# Patient Record
Sex: MALE | Race: WHITE | NOT HISPANIC OR LATINO | URBAN - METROPOLITAN AREA
[De-identification: names, ages, dates, MRNs, and addresses within clinical notes are randomized per-mention and may not be internally consistent; named-entity substitution may affect disease eponyms.]

---

## 2020-08-25 PROBLEM — Z00.00 ENCOUNTER FOR PREVENTIVE HEALTH EXAMINATION: Status: ACTIVE | Noted: 2020-08-25

## 2020-09-01 ENCOUNTER — OUTPATIENT (OUTPATIENT)
Dept: OUTPATIENT SERVICES | Facility: HOSPITAL | Age: 30
LOS: 1 days | Discharge: HOME | End: 2020-09-01

## 2020-09-01 ENCOUNTER — APPOINTMENT (OUTPATIENT)
Dept: DERMATOLOGY | Facility: CLINIC | Age: 30
End: 2020-09-01
Payer: SELF-PAY

## 2020-09-01 PROCEDURE — 99202 OFFICE O/P NEW SF 15 MIN: CPT | Mod: GC

## 2020-09-01 NOTE — ASSESSMENT
[FreeTextEntry1] : 29 yo with hx of psoriasis complaining of psoriasis w/psoriatic skin lesions of the eyebrows and posterior neck, seborrheic dandruff in scalp. \par \par #Psoriasis\par - Betamethasone Dipropionate 0.05% external ointment: apply sparingly to the posterior and lateral neck twice daily. \par ***Do not apply Betamethasone to eyebrows, face, genitalia, or breasts*** \par - Hydrocortisone 2.5% external cream; apply sparingly to the eyebrows twice daily \par \par #Seborrheic dandruff\par Ketoconazole 2% external shampoo; apply twice weekly to scalp for 5 minutes and rinse\par \par follow up in January

## 2020-09-01 NOTE — PHYSICAL EXAM
[FreeTextEntry3] : There is abundant dandruff in the scalp and eyebrows. No skin legions visible in the scalp. There is mild scaling and redness in the b/l eyebrows. Moderate flaking, scales, and raised erythema in the posterior neck region extended bilaterally to the posterior auricle. No skin legions in the arms, legs, or back.

## 2021-05-04 ENCOUNTER — APPOINTMENT (OUTPATIENT)
Dept: DERMATOLOGY | Facility: CLINIC | Age: 31
End: 2021-05-04
Payer: SELF-PAY

## 2021-05-04 ENCOUNTER — OUTPATIENT (OUTPATIENT)
Dept: OUTPATIENT SERVICES | Facility: HOSPITAL | Age: 31
LOS: 1 days | Discharge: HOME | End: 2021-05-04

## 2021-05-04 VITALS
DIASTOLIC BLOOD PRESSURE: 67 MMHG | HEART RATE: 62 BPM | SYSTOLIC BLOOD PRESSURE: 106 MMHG | TEMPERATURE: 97.7 F | OXYGEN SATURATION: 97 % | WEIGHT: 177 LBS

## 2021-05-04 VITALS — HEIGHT: 70 IN | BODY MASS INDEX: 25.4 KG/M2

## 2021-05-04 PROCEDURE — 99213 OFFICE O/P EST LOW 20 MIN: CPT | Mod: GC

## 2021-05-04 RX ORDER — TRIAMCINOLONE ACETONIDE 1 MG/G
0.1 CREAM TOPICAL TWICE DAILY
Qty: 1 | Refills: 5 | Status: ACTIVE | COMMUNITY
Start: 2021-05-04 | End: 1900-01-01

## 2021-05-04 NOTE — END OF VISIT
[] : A student assisted with documenting this visit. I have reviewed and verified all information documented by the student, and made modifications to such information, when appropriate. [FreeTextEntry3] : Resident at [facility] with psoriasis, may be non compliant.\par Plat to give topical steroid with larger quantity provided.\par Screening labs for possible systemic treatment.

## 2021-05-04 NOTE — HISTORY OF PRESENT ILLNESS
[de-identified] : 30 y/o M from [facility] facility w/ PMHx of psoriasis since age 13 and seborrheic dermatitis who presents with whole body rashes. Patient reports that betamethasone and hydrocortisone is ineffective at this time. He states he wants injection for psoriasis. Patient states that he has new rashes appearing on both his arms and legs. He states the Ketoconazole shampoo is helping with his scalp. Patient states he has never had symptoms like this before and his grandfather had psoriasis. Patient states he is on Haldol and risperidone. Patient endorses occasional itchiness. He denies LAD, bleeding, discharge or new moles.

## 2021-05-04 NOTE — ASSESSMENT
[FreeTextEntry1] : 32 y/o M from \[facility]\"" facility w/ PMHx of psoriasis since age 13 and seborrheic dermatitis who presents with whole body rashes.\par \par #psoriasis\par - patient has new, all-over body, rash formation and current creams are ineffective.\par - Prescribed triamcinolone cream in jar \par - Order blood work for possible systemic treatment with MTX, if patient does not respond to cream. \par \par #seborrheic dermatitis, scalp\par - c/w ketoconazole shampoo as needed\par \par Follow up in 1 month. \par \par

## 2021-05-04 NOTE — PHYSICAL EXAM
[Alert] : alert [Oriented x 3] : ~L oriented x 3 [Well Nourished] : well nourished [R Arm] : R Arm [L Arm] : L Arm [R Leg] : R Leg [L Leg] : L Leg [FreeTextEntry3] : New rash formation noted b/l on arms and legs (red, irregular distributed patches).

## 2021-05-10 DIAGNOSIS — L40.9 PSORIASIS, UNSPECIFIED: ICD-10-CM

## 2021-05-11 LAB
ALBUMIN SERPL ELPH-MCNC: 4.6 G/DL
ALP BLD-CCNC: 101 U/L
ALT SERPL-CCNC: 50 U/L
ANION GAP SERPL CALC-SCNC: 14 MMOL/L
AST SERPL-CCNC: 31 U/L
BASOPHILS # BLD AUTO: 0.05 K/UL
BASOPHILS NFR BLD AUTO: 1 %
BILIRUB SERPL-MCNC: 0.2 MG/DL
BUN SERPL-MCNC: 9 MG/DL
CALCIUM SERPL-MCNC: 9.9 MG/DL
CHLORIDE SERPL-SCNC: 103 MMOL/L
CO2 SERPL-SCNC: 24 MMOL/L
CREAT SERPL-MCNC: 0.9 MG/DL
EOSINOPHIL # BLD AUTO: 0.24 K/UL
EOSINOPHIL NFR BLD AUTO: 4.7 %
GLUCOSE SERPL-MCNC: 96 MG/DL
HBV SURFACE AB SER QL: REACTIVE
HBV SURFACE AG SER QL: NONREACTIVE
HCT VFR BLD CALC: 43.2 %
HCV AB SER QL: NONREACTIVE
HCV S/CO RATIO: 0.14 S/CO
HGB BLD-MCNC: 14.5 G/DL
IMM GRANULOCYTES NFR BLD AUTO: 0.4 %
LYMPHOCYTES # BLD AUTO: 1.31 K/UL
LYMPHOCYTES NFR BLD AUTO: 25.8 %
MAN DIFF?: NORMAL
MCHC RBC-ENTMCNC: 30.9 PG
MCHC RBC-ENTMCNC: 33.6 G/DL
MCV RBC AUTO: 91.9 FL
MONOCYTES # BLD AUTO: 0.54 K/UL
MONOCYTES NFR BLD AUTO: 10.6 %
NEUTROPHILS # BLD AUTO: 2.92 K/UL
NEUTROPHILS NFR BLD AUTO: 57.5 %
PLATELET # BLD AUTO: 295 K/UL
POTASSIUM SERPL-SCNC: 4.5 MMOL/L
PROT SERPL-MCNC: 7.7 G/DL
RBC # BLD: 4.7 M/UL
RBC # FLD: 12 %
SODIUM SERPL-SCNC: 141 MMOL/L
WBC # FLD AUTO: 5.08 K/UL

## 2021-06-02 ENCOUNTER — EMERGENCY (EMERGENCY)
Facility: HOSPITAL | Age: 31
LOS: 0 days | Discharge: HOME | End: 2021-06-02
Attending: EMERGENCY MEDICINE | Admitting: EMERGENCY MEDICINE
Payer: SUBSIDIZED

## 2021-06-02 VITALS
HEIGHT: 70 IN | SYSTOLIC BLOOD PRESSURE: 110 MMHG | DIASTOLIC BLOOD PRESSURE: 72 MMHG | WEIGHT: 175.05 LBS | RESPIRATION RATE: 18 BRPM | OXYGEN SATURATION: 96 % | TEMPERATURE: 98 F | HEART RATE: 89 BPM

## 2021-06-02 DIAGNOSIS — R25.1 TREMOR, UNSPECIFIED: ICD-10-CM

## 2021-06-02 DIAGNOSIS — F20.9 SCHIZOPHRENIA, UNSPECIFIED: ICD-10-CM

## 2021-06-02 DIAGNOSIS — Z87.2 PERSONAL HISTORY OF DISEASES OF THE SKIN AND SUBCUTANEOUS TISSUE: ICD-10-CM

## 2021-06-02 DIAGNOSIS — R11.2 NAUSEA WITH VOMITING, UNSPECIFIED: ICD-10-CM

## 2021-06-02 DIAGNOSIS — Z87.898 PERSONAL HISTORY OF OTHER SPECIFIED CONDITIONS: ICD-10-CM

## 2021-06-02 LAB
ALBUMIN SERPL ELPH-MCNC: 4.7 G/DL — SIGNIFICANT CHANGE UP (ref 3.5–5.2)
ALP SERPL-CCNC: 105 U/L — SIGNIFICANT CHANGE UP (ref 30–115)
ALT FLD-CCNC: 83 U/L — HIGH (ref 0–41)
ANION GAP SERPL CALC-SCNC: 10 MMOL/L — SIGNIFICANT CHANGE UP (ref 7–14)
AST SERPL-CCNC: 69 U/L — HIGH (ref 0–41)
BASOPHILS # BLD AUTO: 0.03 K/UL — SIGNIFICANT CHANGE UP (ref 0–0.2)
BASOPHILS NFR BLD AUTO: 0.5 % — SIGNIFICANT CHANGE UP (ref 0–1)
BILIRUB SERPL-MCNC: 0.5 MG/DL — SIGNIFICANT CHANGE UP (ref 0.2–1.2)
BUN SERPL-MCNC: 16 MG/DL — SIGNIFICANT CHANGE UP (ref 10–20)
CALCIUM SERPL-MCNC: 9.6 MG/DL — SIGNIFICANT CHANGE UP (ref 8.5–10.1)
CHLORIDE SERPL-SCNC: 99 MMOL/L — SIGNIFICANT CHANGE UP (ref 98–110)
CO2 SERPL-SCNC: 29 MMOL/L — SIGNIFICANT CHANGE UP (ref 17–32)
CREAT SERPL-MCNC: 0.9 MG/DL — SIGNIFICANT CHANGE UP (ref 0.7–1.5)
EOSINOPHIL # BLD AUTO: 0.02 K/UL — SIGNIFICANT CHANGE UP (ref 0–0.7)
EOSINOPHIL NFR BLD AUTO: 0.3 % — SIGNIFICANT CHANGE UP (ref 0–8)
GLUCOSE SERPL-MCNC: 110 MG/DL — HIGH (ref 70–99)
HCT VFR BLD CALC: 42.8 % — SIGNIFICANT CHANGE UP (ref 42–52)
HGB BLD-MCNC: 14.6 G/DL — SIGNIFICANT CHANGE UP (ref 14–18)
IMM GRANULOCYTES NFR BLD AUTO: 0.3 % — SIGNIFICANT CHANGE UP (ref 0.1–0.3)
LIDOCAIN IGE QN: 39 U/L — SIGNIFICANT CHANGE UP (ref 7–60)
LYMPHOCYTES # BLD AUTO: 1.2 K/UL — SIGNIFICANT CHANGE UP (ref 1.2–3.4)
LYMPHOCYTES # BLD AUTO: 18.1 % — LOW (ref 20.5–51.1)
MAGNESIUM SERPL-MCNC: 2 MG/DL — SIGNIFICANT CHANGE UP (ref 1.8–2.4)
MCHC RBC-ENTMCNC: 30.2 PG — SIGNIFICANT CHANGE UP (ref 27–31)
MCHC RBC-ENTMCNC: 34.1 G/DL — SIGNIFICANT CHANGE UP (ref 32–37)
MCV RBC AUTO: 88.4 FL — SIGNIFICANT CHANGE UP (ref 80–94)
MONOCYTES # BLD AUTO: 0.4 K/UL — SIGNIFICANT CHANGE UP (ref 0.1–0.6)
MONOCYTES NFR BLD AUTO: 6 % — SIGNIFICANT CHANGE UP (ref 1.7–9.3)
NEUTROPHILS # BLD AUTO: 4.96 K/UL — SIGNIFICANT CHANGE UP (ref 1.4–6.5)
NEUTROPHILS NFR BLD AUTO: 74.8 % — SIGNIFICANT CHANGE UP (ref 42.2–75.2)
NRBC # BLD: 0 /100 WBCS — SIGNIFICANT CHANGE UP (ref 0–0)
PLATELET # BLD AUTO: 281 K/UL — SIGNIFICANT CHANGE UP (ref 130–400)
POTASSIUM SERPL-MCNC: 6.7 MMOL/L — CRITICAL HIGH (ref 3.5–5)
POTASSIUM SERPL-SCNC: 6.7 MMOL/L — CRITICAL HIGH (ref 3.5–5)
PROT SERPL-MCNC: 8 G/DL — SIGNIFICANT CHANGE UP (ref 6–8)
RBC # BLD: 4.84 M/UL — SIGNIFICANT CHANGE UP (ref 4.7–6.1)
RBC # FLD: 11.9 % — SIGNIFICANT CHANGE UP (ref 11.5–14.5)
SODIUM SERPL-SCNC: 138 MMOL/L — SIGNIFICANT CHANGE UP (ref 135–146)
WBC # BLD: 6.63 K/UL — SIGNIFICANT CHANGE UP (ref 4.8–10.8)
WBC # FLD AUTO: 6.63 K/UL — SIGNIFICANT CHANGE UP (ref 4.8–10.8)

## 2021-06-02 PROCEDURE — 93010 ELECTROCARDIOGRAM REPORT: CPT

## 2021-06-02 PROCEDURE — 99285 EMERGENCY DEPT VISIT HI MDM: CPT

## 2021-06-02 PROCEDURE — 99053 MED SERV 10PM-8AM 24 HR FAC: CPT

## 2021-06-02 RX ORDER — ONDANSETRON 8 MG/1
4 TABLET, FILM COATED ORAL ONCE
Refills: 0 | Status: COMPLETED | OUTPATIENT
Start: 2021-06-02 | End: 2021-06-02

## 2021-06-02 RX ORDER — SODIUM CHLORIDE 9 MG/ML
1000 INJECTION INTRAMUSCULAR; INTRAVENOUS; SUBCUTANEOUS ONCE
Refills: 0 | Status: COMPLETED | OUTPATIENT
Start: 2021-06-02 | End: 2021-06-02

## 2021-06-02 RX ORDER — FAMOTIDINE 10 MG/ML
20 INJECTION INTRAVENOUS ONCE
Refills: 0 | Status: COMPLETED | OUTPATIENT
Start: 2021-06-02 | End: 2021-06-02

## 2021-06-02 RX ADMIN — FAMOTIDINE 104 MILLIGRAM(S): 10 INJECTION INTRAVENOUS at 06:42

## 2021-06-02 RX ADMIN — ONDANSETRON 4 MILLIGRAM(S): 8 TABLET, FILM COATED ORAL at 06:42

## 2021-06-02 RX ADMIN — SODIUM CHLORIDE 1000 MILLILITER(S): 9 INJECTION INTRAMUSCULAR; INTRAVENOUS; SUBCUTANEOUS at 03:11

## 2021-06-02 NOTE — ED PROVIDER NOTE - PHYSICAL EXAMINATION
VITAL SIGNS: I have reviewed nursing notes and confirm.  CONSTITUTIONAL: Well-developed; well-nourished; in no acute distress.  SKIN: Skin exam is warm and dry, no acute rash.  HEAD: Normocephalic; atraumatic.  EYES: PERRL, EOM intact; conjunctiva and sclera clear, no nystagmus  NECK: Supple; non tender.  CARD: RRR, no murmur  RESP: No wheezes, rales or rhonchi.  ABD: Normal bowel sounds; soft; non-distended; non-tender; no rebound or guarding, no CVA ttp  EXT: Normal ROM. No clubbing, cyanosis or edema.  NEURO: Alert, oriented. Grossly unremarkable. No focal deficits, no tremor at rest or with intention  PSYCH: Cooperative, appropriate, flat affect

## 2021-06-02 NOTE — ED PROVIDER NOTE - OBJECTIVE STATEMENT
32 yo M, hx of 30 yo M, hx of schizophrenia, alcohol and cannabis abuse, psoriasis, here for assessment of nausea, vomiting -- sx began after dinner, associated with feeling shaky. No blood or bile in vomitus, no abdominal pain, though does report feeling burning in his esophagus and back of throat. No diarrhea, constipation. Denies recent drinking, drug use.     No sick contacts, recent travel.

## 2021-06-02 NOTE — ED ADULT TRIAGE NOTE - CHIEF COMPLAINT QUOTE
He's from [facility], they called because he's been vomiting since dinner - EMS  I have a headache, I feel shaky and I keep vomiting - patient

## 2021-06-02 NOTE — ED PROVIDER NOTE - CLINICAL SUMMARY MEDICAL DECISION MAKING FREE TEXT BOX
Labs unremarkable, EKG with normal intervals, patient received zofran, tolerated crackers and juice without further nausea, vomiting, is resting comfortably.    Will dc back to facility. Has no signs of withdrawal, no ttp in abdomen to suggest acute intra-abdominal pathology.

## 2021-06-02 NOTE — ED ADULT NURSE REASSESSMENT NOTE - NS ED NURSE REASSESS COMMENT FT1
Pt d/c & awaiting transport back to [address] [place] [address]; pt still c/o nausea, but was just given nausea medication. Will give it some more time to kick in and reassess.

## 2021-06-02 NOTE — ED PROVIDER NOTE - NSFOLLOWUPINSTRUCTIONS_ED_ALL_ED_FT

## 2021-06-02 NOTE — ED PROVIDER NOTE - NS ED ROS FT
Constitutional: no fever, chills, no recent weight loss, change in appetite or malaise  Cardiac: No chest pain, SOB or edema.  Respiratory: No cough or respiratory distress  GI: see HPI  : No dysuria, frequency, urgency or hematuria  MS: no pain to back or extremities, no loss of ROM, no weakness  Neuro: see HPI  Skin: No skin rash.  Except as documented in the HPI, all other systems are negative.

## 2021-06-02 NOTE — ED ADULT NURSE NOTE - CHIEF COMPLAINT QUOTE
He's from [facility], they called because he's been vomiting since dinner - EMS  I have a headache, I feel shaky and I keep vomiting - patient

## 2021-06-02 NOTE — ED PROVIDER NOTE - PATIENT PORTAL LINK FT
You can access the FollowMyHealth Patient Portal offered by [facility] by registering at the following website: [email or web address]. By joining [facility]’s FollowMyHealth portal, you will also be able to view your health information using other applications (apps) compatible with our system.

## 2021-08-11 NOTE — ED ADULT TRIAGE NOTE - BP NONINVASIVE DIASTOLIC (MM HG)
Pt needs to know if need to renew 2 RX for lasix and furosemide.  Pt is out of both and call pt [phone] if need refills   72

## 2021-10-05 ENCOUNTER — APPOINTMENT (OUTPATIENT)
Dept: DERMATOLOGY | Facility: CLINIC | Age: 31
End: 2021-10-05
Payer: COMMERCIAL

## 2021-10-05 ENCOUNTER — OUTPATIENT (OUTPATIENT)
Dept: OUTPATIENT SERVICES | Facility: HOSPITAL | Age: 31
LOS: 1 days | Discharge: HOME | End: 2021-10-05

## 2021-10-05 DIAGNOSIS — L40.9 PSORIASIS, UNSPECIFIED: ICD-10-CM

## 2021-10-05 DIAGNOSIS — L21.9 SEBORRHEIC DERMATITIS, UNSPECIFIED: ICD-10-CM

## 2021-10-05 PROCEDURE — 99213 OFFICE O/P EST LOW 20 MIN: CPT | Mod: GC

## 2021-10-05 RX ORDER — HYDROCORTISONE 25 MG/G
2.5 CREAM TOPICAL TWICE DAILY
Qty: 1 | Refills: 5 | Status: ACTIVE | COMMUNITY
Start: 2020-09-01

## 2021-10-05 RX ORDER — RISPERIDONE 3 MG/1
3 TABLET, FILM COATED ORAL TWICE DAILY
Refills: 0 | Status: ACTIVE | COMMUNITY
Start: 2021-10-05

## 2021-10-05 RX ORDER — NALTREXONE HYDROCHLORIDE 50 MG/1
50 TABLET, FILM COATED ORAL DAILY
Qty: 30 | Refills: 0 | Status: ACTIVE | COMMUNITY
Start: 2021-10-05

## 2021-10-05 RX ORDER — HALOPERIDOL DECANOATE 100 MG/ML
100 INJECTION INTRAMUSCULAR
Refills: 0 | Status: ACTIVE | COMMUNITY
Start: 2021-10-05

## 2021-10-05 RX ORDER — BENZTROPINE MESYLATE 0.5 MG/1
0.5 TABLET ORAL TWICE DAILY
Refills: 0 | Status: ACTIVE | COMMUNITY
Start: 2021-10-05

## 2021-10-05 NOTE — ASSESSMENT
[FreeTextEntry1] : 32 y/o M from [facility] facility w/ PMHx of psoriasis since age 13 and seborrheic dermatitis who presents with whole body rashes.\par \par #psoriasis\par - patient has new, all-over body, rash formation and current creams are ineffective.\par - Prescribed triamcinolone cream in jar---ineffective. Started MTX once weekly with folic acid 6 days a week\par - Order blood work 2 months \par \par #seborrheic dermatitis, scalp\par - c/w ketoconazole shampoo as needed\par \par Follow up in 2 month.

## 2021-10-05 NOTE — HISTORY OF PRESENT ILLNESS
[de-identified] : 30 y/o M from [facility] facility w/ PMHx of psoriasis since age 13 and seborrheic dermatitis who presents with whole body rashes. Patient reports that betamethasone and hydrocortisone is ineffective at this time. He states he wants injection for psoriasis. Patient states that he has new rashes appearing on both his arms and legs. He states the Ketoconazole shampoo is helping with his scalp. Patient states he has never had symptoms like this before and his grandfather had psoriasis. Patient states he is on Haldol and risperidone. Patient endorses occasional itchiness. He denies LAD, bleeding, discharge or new moles. \par \par Today the patient reports no improvement in triamcinolone and would like something orally. \par \par

## 2021-10-05 NOTE — END OF VISIT
[] : Resident [FreeTextEntry3] : f/u widespread psoriasis.\par Mainly head and neck affected now, possibly because he is out of hydrocortisone cream.\par Plan to start 5mg MTX weekly and re exam in 2 months with new CBC,CMP.\par Also, he already takes folic acid 1mg daily, but he needs to skip taking it on the day he takes MTX

## 2021-10-05 NOTE — PHYSICAL EXAM
[Alert] : alert [Scalp] : Scalp [Face] : Face [Neck] : Neck [Chest] : Chest [Back] : Back [FreeTextEntry3] : Extensive psoriasis in face and neck, patches noted in sides of trunk and back.

## 2021-12-08 LAB
ALBUMIN SERPL ELPH-MCNC: 4.9 G/DL
ALP BLD-CCNC: 101 U/L
ALT SERPL-CCNC: 46 U/L
ANION GAP SERPL CALC-SCNC: 15 MMOL/L
AST SERPL-CCNC: 28 U/L
BASOPHILS # BLD AUTO: 0.05 K/UL
BASOPHILS NFR BLD AUTO: 1 %
BILIRUB SERPL-MCNC: 0.3 MG/DL
BUN SERPL-MCNC: 15 MG/DL
CALCIUM SERPL-MCNC: 10.1 MG/DL
CHLORIDE SERPL-SCNC: 101 MMOL/L
CO2 SERPL-SCNC: 21 MMOL/L
CREAT SERPL-MCNC: 1.1 MG/DL
EOSINOPHIL # BLD AUTO: 0.07 K/UL
EOSINOPHIL NFR BLD AUTO: 1.4 %
GLUCOSE SERPL-MCNC: 105 MG/DL
HCT VFR BLD CALC: 44.3 %
HGB BLD-MCNC: 15.3 G/DL
IMM GRANULOCYTES NFR BLD AUTO: 0.6 %
LYMPHOCYTES # BLD AUTO: 2.01 K/UL
LYMPHOCYTES NFR BLD AUTO: 40.9 %
MAN DIFF?: NORMAL
MCHC RBC-ENTMCNC: 30.6 PG
MCHC RBC-ENTMCNC: 34.5 G/DL
MCV RBC AUTO: 88.6 FL
MONOCYTES # BLD AUTO: 0.43 K/UL
MONOCYTES NFR BLD AUTO: 8.8 %
NEUTROPHILS # BLD AUTO: 2.32 K/UL
NEUTROPHILS NFR BLD AUTO: 47.3 %
PLATELET # BLD AUTO: 295 K/UL
POTASSIUM SERPL-SCNC: 4.2 MMOL/L
PROT SERPL-MCNC: 8.3 G/DL
RBC # BLD: 5 M/UL
RBC # FLD: 11.9 %
SODIUM SERPL-SCNC: 137 MMOL/L
WBC # FLD AUTO: 4.91 K/UL

## 2021-12-08 RX ORDER — FOLIC ACID 1 MG/1
1 TABLET ORAL DAILY
Qty: 30 | Refills: 5 | Status: ACTIVE | COMMUNITY
Start: 2021-10-05 | End: 1900-01-01

## 2021-12-10 ENCOUNTER — NON-APPOINTMENT (OUTPATIENT)
Age: 31
End: 2021-12-10

## 2021-12-14 ENCOUNTER — NON-APPOINTMENT (OUTPATIENT)
Age: 31
End: 2021-12-14

## 2022-02-01 ENCOUNTER — NON-APPOINTMENT (OUTPATIENT)
Age: 32
End: 2022-02-01

## 2022-04-26 ENCOUNTER — OUTPATIENT (OUTPATIENT)
Dept: OUTPATIENT SERVICES | Facility: HOSPITAL | Age: 32
LOS: 1 days | Discharge: HOME | End: 2022-04-26

## 2022-04-26 ENCOUNTER — APPOINTMENT (OUTPATIENT)
Dept: DERMATOLOGY | Facility: CLINIC | Age: 32
End: 2022-04-26
Payer: SELF-PAY

## 2022-04-26 PROCEDURE — 99214 OFFICE O/P EST MOD 30 MIN: CPT | Mod: GC

## 2022-04-26 RX ORDER — BETAMETHASONE DIPROPIONATE 0.5 MG/G
0.05 OINTMENT, AUGMENTED TOPICAL TWICE DAILY
Qty: 1 | Refills: 5 | Status: ACTIVE | COMMUNITY
Start: 2020-09-01

## 2022-04-26 NOTE — END OF VISIT
[] : Resident [FreeTextEntry3] : f/u psoriasis, plaques flattened but not clearing.\par Will raise MTX dose and restart stronger topical steroid.\par He will f/u with a derm in [place].\par He can attend Derm clinic at [facility]

## 2022-04-26 NOTE — HISTORY OF PRESENT ILLNESS
[de-identified] : 33 y/o M from [facility] facility w/ PMHx of psoriasis since age 13 and seborrheic dermatitis who presents with whole body rashes. At last visit was started on 5mg methotrexate weekly 5mg blood work since was wnl. Methotrextae was then increased to 10mg weekly.\par Pt. reports rashes unchanged.  Also uses triamcinolone at times.\par

## 2022-04-26 NOTE — ASSESSMENT
[FreeTextEntry1] : #psoriasis - increase methotrexate from 10mg to 12.5 weekly, c/w folic acid \par - restart betamethasone cream\par \par #seborrheic dermatitis, scalp\par - c/w ketoconazole shampoo as needed

## 2022-05-29 RX ORDER — METHOTREXATE 2.5 MG/1
2.5 TABLET ORAL
Qty: 24 | Refills: 6 | Status: ACTIVE | COMMUNITY
Start: 2021-10-05 | End: 1900-01-01

## 2022-05-29 RX ORDER — KETOCONAZOLE 20.5 MG/ML
2 SHAMPOO, SUSPENSION TOPICAL
Qty: 1 | Refills: 11 | Status: ACTIVE | COMMUNITY
Start: 2020-09-01